# Patient Record
Sex: FEMALE | Race: WHITE | Employment: STUDENT | URBAN - METROPOLITAN AREA
[De-identification: names, ages, dates, MRNs, and addresses within clinical notes are randomized per-mention and may not be internally consistent; named-entity substitution may affect disease eponyms.]

---

## 2019-04-29 ENCOUNTER — TRANSFERRED RECORDS (OUTPATIENT)
Dept: HEALTH INFORMATION MANAGEMENT | Facility: CLINIC | Age: 24
End: 2019-04-29

## 2019-04-29 LAB — TSH SERPL-ACNC: <0.03 UIU/ML (ref 0.38–4.31)

## 2019-05-03 ENCOUNTER — TRANSFERRED RECORDS (OUTPATIENT)
Dept: HEALTH INFORMATION MANAGEMENT | Facility: CLINIC | Age: 24
End: 2019-05-03

## 2019-05-08 ENCOUNTER — TELEPHONE (OUTPATIENT)
Dept: ENDOCRINOLOGY | Facility: CLINIC | Age: 24
End: 2019-05-08

## 2019-05-08 NOTE — TELEPHONE ENCOUNTER
Yvrose with [facility] calls to confirm referral was received and if patient has scheduled appointment. Advised Yvrose we did receive referral and appointment has been scheduled.

## 2019-05-08 NOTE — TELEPHONE ENCOUNTER
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    Needs appt.  Records on michael's desk in [place].    I left a message for the patient to return our call.     Michael Camarena, TINY  [facility]  [facility]

## 2019-05-13 ENCOUNTER — HOSPITAL ENCOUNTER (OUTPATIENT)
Dept: ULTRASOUND IMAGING | Facility: CLINIC | Age: 24
End: 2019-05-13
Attending: NURSE PRACTITIONER
Payer: COMMERCIAL

## 2019-05-13 DIAGNOSIS — E04.2 MULTINODULAR GOITER: ICD-10-CM

## 2019-05-13 PROCEDURE — 76536 US EXAM OF HEAD AND NECK: CPT

## 2019-05-16 ENCOUNTER — OFFICE VISIT (OUTPATIENT)
Dept: ENDOCRINOLOGY | Facility: CLINIC | Age: 24
End: 2019-05-16
Payer: COMMERCIAL

## 2019-05-16 VITALS
TEMPERATURE: 98.2 F | RESPIRATION RATE: 20 BRPM | HEIGHT: 70 IN | DIASTOLIC BLOOD PRESSURE: 80 MMHG | HEART RATE: 100 BPM | BODY MASS INDEX: 20.94 KG/M2 | OXYGEN SATURATION: 99 % | WEIGHT: 146.3 LBS | SYSTOLIC BLOOD PRESSURE: 122 MMHG

## 2019-05-16 DIAGNOSIS — E05.90 HYPERTHYROIDISM: ICD-10-CM

## 2019-05-16 LAB
BASOPHILS # BLD AUTO: 0 10E9/L (ref 0–0.2)
BASOPHILS NFR BLD AUTO: 0.4 %
DIFFERENTIAL METHOD BLD: NORMAL
EOSINOPHIL # BLD AUTO: 0.1 10E9/L (ref 0–0.7)
EOSINOPHIL NFR BLD AUTO: 1.2 %
ERYTHROCYTE [DISTWIDTH] IN BLOOD BY AUTOMATED COUNT: 12.4 % (ref 10–15)
HCT VFR BLD AUTO: 43.9 % (ref 35–47)
HGB BLD-MCNC: 14.6 G/DL (ref 11.7–15.7)
LYMPHOCYTES # BLD AUTO: 1.8 10E9/L (ref 0.8–5.3)
LYMPHOCYTES NFR BLD AUTO: 34.8 %
MCH RBC QN AUTO: 29.2 PG (ref 26.5–33)
MCHC RBC AUTO-ENTMCNC: 33.3 G/DL (ref 31.5–36.5)
MCV RBC AUTO: 88 FL (ref 78–100)
MONOCYTES # BLD AUTO: 0.4 10E9/L (ref 0–1.3)
MONOCYTES NFR BLD AUTO: 6.7 %
NEUTROPHILS # BLD AUTO: 3 10E9/L (ref 1.6–8.3)
NEUTROPHILS NFR BLD AUTO: 56.9 %
PLATELET # BLD AUTO: 244 10E9/L (ref 150–450)
RBC # BLD AUTO: 5 10E12/L (ref 3.8–5.2)
WBC # BLD AUTO: 5.2 10E9/L (ref 4–11)

## 2019-05-16 PROCEDURE — 84481 FREE ASSAY (FT-3): CPT | Performed by: INTERNAL MEDICINE

## 2019-05-16 PROCEDURE — 99204 OFFICE O/P NEW MOD 45 MIN: CPT | Performed by: INTERNAL MEDICINE

## 2019-05-16 PROCEDURE — 80076 HEPATIC FUNCTION PANEL: CPT | Performed by: INTERNAL MEDICINE

## 2019-05-16 PROCEDURE — 84443 ASSAY THYROID STIM HORMONE: CPT | Performed by: INTERNAL MEDICINE

## 2019-05-16 PROCEDURE — 85025 COMPLETE CBC W/AUTO DIFF WBC: CPT | Performed by: INTERNAL MEDICINE

## 2019-05-16 PROCEDURE — 99000 SPECIMEN HANDLING OFFICE-LAB: CPT | Performed by: INTERNAL MEDICINE

## 2019-05-16 PROCEDURE — 84439 ASSAY OF FREE THYROXINE: CPT | Performed by: INTERNAL MEDICINE

## 2019-05-16 PROCEDURE — 84445 ASSAY OF TSI GLOBULIN: CPT | Mod: 90 | Performed by: INTERNAL MEDICINE

## 2019-05-16 PROCEDURE — 36415 COLL VENOUS BLD VENIPUNCTURE: CPT | Performed by: INTERNAL MEDICINE

## 2019-05-16 ASSESSMENT — MIFFLIN-ST. JEOR: SCORE: 1503.86

## 2019-05-16 NOTE — PROGRESS NOTES
Name: Vale Page  Seen in consultation with Luciana Rodrigues FNBABAR for Hyperthyroidism and thyromegaly.    HPI:  Vale Page is a 24 year old female who presents for the evaluation of Hyperthyroidism.  Having s/s since dec 2018  Noticed that thyroid was big in fall 2018. Feels like it is getting bigger.  She is a nursing student at [employer].  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    History of radiation exposure: NO  History of thyroid dysfunction: NO  Palpitations:  On and off since dec 2018.  Changes to hair or skin: No  Diarrhea/Constipation:both on and off. More constipation.  Changes in menses: has implant in place  Changes in vision:No  Diplopia/Blurryness:No  Dysphagia or Shortness of breath:more like fullness  Muscle aches or pain:No  Tremors:Yes: ongoing X Dec 2018  Difficulty sleeping: difficulty falling asleep.  Changes in weight: No  Lithium/Amiodarone: No  URI: No  CT Scans:No    PMH/PSH:  History reviewed. No pertinent past medical history.  Past Surgical History:   Procedure Laterality Date     AS REMOVAL OF TONSILS,<13 Y/O       Family Hx:  Family History   Problem Relation Age of Onset     Family History Negative Mother      Hypertension Maternal Grandfather      Thyroid disease: No           Social Hx:  Social History     Socioeconomic History     Marital status: Single     Spouse name: Not on file     Number of children: Not on file     Years of education: Not on file     Highest education level: Not on file   Occupational History     Not on file   Social Needs     Financial resource strain: Not on file     Food insecurity:     Worry: Not on file     Inability: Not on file     Transportation needs:     Medical: Not on file     Non-medical: Not on file   Tobacco Use     Smoking status: Never Smoker     Smokeless tobacco: Never Used   Substance and Sexual Activity     Alcohol use: Yes     " Comment: occasionally     Drug use: Never     Sexual activity: Yes   Lifestyle     Physical activity:     Days per week: Not on file     Minutes per session: Not on file     Stress: Not on file   Relationships     Social connections:     Talks on phone: Not on file     Gets together: Not on file     Attends Latter day service: Not on file     Active member of club or organization: Not on file     Attends meetings of clubs or organizations: Not on file     Relationship status: Not on file     Intimate partner violence:     Fear of current or ex partner: Not on file     Emotionally abused: Not on file     Physically abused: Not on file     Forced sexual activity: Not on file   Other Topics Concern     Parent/sibling w/ CABG, MI or angioplasty before 65F 55M? Not Asked   Social History Narrative     Not on file          MEDICATIONS:  currently has no medications in their medication list.    ROS   ROS: 10 point ROS neg other than the symptoms noted above in the HPI.    Physical Exam   VS: /80 (BP Location: Right arm, Patient Position: Sitting, Cuff Size: Adult Regular)   Pulse 100   Temp 98.2  F (36.8  C) (Oral)   Resp 20   Ht 1.778 m (5' 10\")   Wt 66.4 kg (146 lb 4.8 oz)   LMP 04/24/2019 (Approximate)   SpO2 99%   Breastfeeding? No   BMI 20.99 kg/m    GENERAL: AXOX3, NAD, well dressed, answering questions appropriately, appears stated age.  HEENT: OP clear, no LAD, no TM, non-tender, no exopthalmous, no proptosis, EOMI, no lig lag, no retraction  NECK: Thyroid moderately enlarged, non tender, no nodules were palpated.  CV: RRR, no rubs, gallops, no murmurs  LUNGS: CTAB, no wheezes, rales, or ronchi  ABDOMEN: +BS  EXTREMITIES: no edema, +pulses, no rashes, no lesions, no pretibial edema  NEUROLOGY: CN grossly intact, + DTR upper and lower extremity, no tremors  MSK: grossly intact  SKIN: no rashes, no lesions    LABS:  Labs done in April 2019 showing TSH less than 0.03, free T4 was high at 3.9, free T4 " was high normal at 1.5.  These labs were done at [facility].    Thyroid US 5/2019:  ULTRASOUND THYROID   5/13/2019 11:12 AM     HISTORY: Multinodular goiter.     COMPARISON: None.     FINDINGS: The thyroid gland appears mildly prominent in size. The  right lobe of the thyroid measures 5.6 x 1.8 x 1.5 cm. The left lobe  of the thyroid measures 5.6 x 2 x 1.7 cm. The isthmus measures 0.4 cm.  Background thyroid parenchymal echogenicity is heterogeneous and  diffusely hypervascular. No discrete thyroid nodules are identified. A  mildly prominent lymph node within zone II on the right measures 1.9 x  1.3 x 0.6 cm. This lymph node demonstrates no definite associated  fatty hilum and appears mildly hypervascular. A small normal-appearing  lymph node with a small fatty hilum corresponds to a palpable  abnormality in the left neck posteriorly, and measures 1 x 1 x 0.3 cm.                                                                      IMPRESSION:   1. The thyroid gland appears mildly prominent, heterogeneous, and  diffusely hypervascular.  2. No discrete thyroid nodules or masses are seen.  3. A mildly prominent level II lymph node on the right demonstrates no  definite fatty hilum and mildly increased vascularity. This lymph node  is nonspecific and may be reactive, however given the lack of visible  fatty hilum, clinical correlation and follow-up are recommended.  All pertinent notes, labs, and images personally reviewed by me.   Available records, labs and images from outside clinic were personally reviewed.    A/P  Ms.Bridget Page is a 24 year old here for the evaluation of hyperthyroidism.    Suppressed TSH:  Differential for hyperthyroidism includes:  Graves' disease, thyroiditis, or autonomous hyperfunctioning nodule.    Labs from April 2019 consistent with hyperthyroidism with suppressed TSH and high free T4  Clinically looks euthyroid  Has occasional palpitations.  Denies unintentional  weight loss or diarrhea  Heart rate is in 100s.  She feels fine.  Plan:  Discussed diagnosis, pathophysiology, management and treatment options of condition with pt.  Discussed differential diagnosis hyperthyroidism.  Today we will repeat her thyroid function (TSH, freeT4, T3 total, TPO, TSI), CBC, LFT and obtain an uptake and scan.    Based on these results further treatment will be discussed.     Heart rate is 100.  Discussed small dose of beta-blocker.  But at this time she would like to hold off.  She will call my office if she noticed more palpitation.  Discussed signs and symptoms of hyperthyroidism to watch for.    An uptake and scan of her thyroid gland will help differentiate the diagnosis.  In Graves' disease her uptake will be homogeneous and increased, in thyroiditis her uptake will be low, and in an autonomous hyperfunctioing nodule the uptake will be increased in a focal area.    Thyroid-stimulating immunoglobulins (TSI) can be detected in the majority of patients (77.8%) with Graves  disease.  It can be used to predict relapse or remission when using PTU/methimazole or radioiodine.  These assays have also been advocated for use in patients with subclinical Graves  hyperthyroidism or patients with unilateral ophthalmopathy.  Thyrotoxicosis associated with subacute thyroiditis is usually mild and transient.  The   patient lacks the physical findings of long-standing thyrotoxicosis. The goiter is typically  painful and low or absent 131I uptake.  Usually the erythrocyte sedimentation rate (ESR) and CRP are greatly elevated, and the leukocyte count may also be increased. Antibody titers are low or negative.    Thyromegaly:  Moderately enlarged thyroid gland  No discrete nodule identified on ultrasound  No major compressive symptoms  No family history of thyroid cancer.  No history of radiation  Plan:  Discussed diagnosis, pathophysiology, management and treatment options of condition with pt.    Abnormal  lymph node on ultrasound:  Incidental finding of abnormal zone 2 lymph node as noted above  No other lymphadenopathy noted  Will get CBC  Recommend close follow-up with repeat ultrasound in 3-4 months        An uptake and scan of her thyroid gland will help differentiate the diagnosis.  In Graves' disease her uptake will be homogeneous and increased, in thyroiditis her uptake will be low, and in an autonomous hyperfunctioing nodule the uptake will be increased in a focal area.    Thyroid-stimulating immunoglobulins (TSI) can be detected in the majority of patients (77.8%) with Graves  disease.  It can be used to predict relapse or remission when using PTU/methimazole or radioiodine.  These assays have also been advocated for use in patients with subclinical Graves  hyperthyroidism or patients with unilateral ophthalmopathy.  Thyrotoxicosis associated with subacute thyroiditis is usually mild and transient.  The   patient lacks the physical findings of long-standing thyrotoxicosis. The goiter is typically  painful and low or absent 131I uptake.  Usually the erythrocyte sedimentation rate (ESR) and CRP are greatly elevated, and the leukocyte count may also be increased. Antibody titers are low or negative.    Treatment of Graves' hyperthyroidism consists of amelioration of symptoms with a beta-blocker and measures aimed at decreasing thyroid hormone synthesis: the administration of a thionamide, radioiodine ablation, or surgery.  Thionamides -- Thionamides (methimazole and propylthiouracial (PTU)).  Methimazole is preferred because of its longer duration of action (once daily dosing) and has lower incidence of side effects.   PTU is preferred during pregnancy because of the potential teratogenic effects of methimazole.   The goal of therapy in Graves' hyperthyroidism is to be euthyroid within three to eight weeks. This can be followed by ablative therapy with radioiodine or surgery or by continuation of the drug for a  prolonged period (usually one to two years) with the hope of attaining a permanent remission. If long-term medical therapy is chosen, the dose of methimazole is then tapered to a maintenance dose with the goal of maintaining a euthyroid state.  Both MMI and PTU can cause pruritus, rash, urticaria, arthralgias, arthritis, fever, abnormal taste sensation, nausea, or vomiting in up to 13 percent of patients.  If one drug is not tolerated, the other drug can be substituted, but up to 50 percent of patients have cross-sensitivity. The gastrointestinal side effects are dose-dependent. Thus, patients taking higher doses of MMI should be started on divided doses.  Agranulocytosis is a rare but serious complication of thionamide therapy, with a prevalence of 0.2 to 0.5 percent, and usually occurs within the first two months of treatment. The risk of agranulocytosis is higher for antithyroid drugs than for 20 other classes of drugs associated with this rare complication.  Hepatotoxicity is a rare complication of thionamide therapy. Serum aminotransferase concentrations increase transiently in up to one-third of patients taking PTU.    Radioiodine ablation -- Radioiodine is widely used for the treatment of Graves' hyperthyroidism. It is the therapy of choice in the [place].  Radioiodine therapy may be associated with an increased risk of the development or worsening of Graves' ophthalmopathy.  Radioiodine is administered as a capsule and induces extensive tissue damage, resulting in ablation of the thyroid within 6 to 18 weeks.   Approximately 20 percent of patients fail the first radioiodine treatment and require a second or subsequent dose. These patients usually have more severe hyperthyroidism or larger goiters.    Follow-up:  After above.    Liss Godoy MD  Endocrinology  [place] [facility]  CC: No Ref-Primary, Physician     More than 50% of face to face time spent with Ms. Page on counseling /  coordinating her care.      All questions were answered.  The patient indicates understanding of the above issues and agrees with the plan set forth.     Addendum to above note and clinic visit:    Labs reviewed.    See result note/telephone encounter.

## 2019-05-16 NOTE — LETTER
[facility]  [address], [address]  [place] [address]  [phone]        May 28, 2019    Vale Camilo  [address] [facility] [address]            Dear Ms. Vale Page:    Labs/ Imaging studies done with endocrinology showed:   Lab screening test for Graves' disease--TSI antibodies was negative.     Please call endocrinology clinic (nurse line: [phone]) if questions.     Sincerely,        Dr. Liss Godoy    Results for orders placed or performed in visit on 05/16/19   T3 Free   Result Value Ref Range    Free T3 2.8 2.3 - 4.2 pg/mL   T4 free   Result Value Ref Range    T4 Free 1.06 0.76 - 1.46 ng/dL   TSH   Result Value Ref Range    TSH 0.01 (L) 0.40 - 4.00 mU/L   Thyroid stimulating immunoglobulin   Result Value Ref Range    Thyroid Stim Immunog <1.0 <=1.3 TSI index   CBC with platelets and differential   Result Value Ref Range    WBC 5.2 4.0 - 11.0 10e9/L    RBC Count 5.00 3.8 - 5.2 10e12/L    Hemoglobin 14.6 11.7 - 15.7 g/dL    Hematocrit 43.9 35.0 - 47.0 %    MCV 88 78 - 100 fl    MCH 29.2 26.5 - 33.0 pg    MCHC 33.3 31.5 - 36.5 g/dL    RDW 12.4 10.0 - 15.0 %    Platelet Count 244 150 - 450 10e9/L    % Neutrophils 56.9 %    % Lymphocytes 34.8 %    % Monocytes 6.7 %    % Eosinophils 1.2 %    % Basophils 0.4 %    Absolute Neutrophil 3.0 1.6 - 8.3 10e9/L    Absolute Lymphocytes 1.8 0.8 - 5.3 10e9/L    Absolute Monocytes 0.4 0.0 - 1.3 10e9/L    Absolute Eosinophils 0.1 0.0 - 0.7 10e9/L    Absolute Basophils 0.0 0.0 - 0.2 10e9/L    Diff Method Automated Method    Hepatic panel   Result Value Ref Range    Bilirubin Direct 0.3 (H) 0.0 - 0.2 mg/dL    Bilirubin Total 2.1 (H) 0.2 - 1.3 mg/dL    Albumin 3.9 3.4 - 5.0 g/dL    Protein Total 7.5 6.8 - 8.8 g/dL    Alkaline Phosphatase 63 40 - 150 U/L    ALT 23 0 - 50 U/L    AST 20 0 - 45 U/L

## 2019-05-16 NOTE — LETTER
5/16/2019         RE: Vale Page  [address]  [facility] [address]        Dear Colleague,    Thank you for referring your patient, Vale Page, to the [facility]. Please see a copy of my visit note below.    Name: Vale Page  Seen in consultation with Luciana Rodrigues FNP for Hyperthyroidism and thyromegaly.    HPI:  Vale Page is a 24 year old female who presents for the evaluation of Hyperthyroidism.  Having s/s since dec 2018  Noticed that thyroid was big in fall 2018. Feels like it is getting bigger.  She is a nursing student at [employer].  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    History of radiation exposure: NO  History of thyroid dysfunction: NO  Palpitations:  On and off since dec 2018.  Changes to hair or skin: No  Diarrhea/Constipation:both on and off. More constipation.  Changes in menses: has implant in place  Changes in vision:No  Diplopia/Blurryness:No  Dysphagia or Shortness of breath:more like fullness  Muscle aches or pain:No  Tremors:Yes: ongoing X Dec 2018  Difficulty sleeping: difficulty falling asleep.  Changes in weight: No  Lithium/Amiodarone: No  URI: No  CT Scans:No    PMH/PSH:  History reviewed. No pertinent past medical history.  Past Surgical History:   Procedure Laterality Date     AS REMOVAL OF TONSILS,<15 Y/O       Family Hx:  Family History   Problem Relation Age of Onset     Family History Negative Mother      Hypertension Maternal Grandfather      Thyroid disease: No           Social Hx:  Social History     Socioeconomic History     Marital status: Single     Spouse name: Not on file     Number of children: Not on file     Years of education: Not on file     Highest education level: Not on file   Occupational History     Not on file   Social Needs     Financial resource strain: Not on file     Food insecurity:     Worry: Not on file     " Inability: Not on file     Transportation needs:     Medical: Not on file     Non-medical: Not on file   Tobacco Use     Smoking status: Never Smoker     Smokeless tobacco: Never Used   Substance and Sexual Activity     Alcohol use: Yes     Comment: occasionally     Drug use: Never     Sexual activity: Yes   Lifestyle     Physical activity:     Days per week: Not on file     Minutes per session: Not on file     Stress: Not on file   Relationships     Social connections:     Talks on phone: Not on file     Gets together: Not on file     Attends Taoist service: Not on file     Active member of club or organization: Not on file     Attends meetings of clubs or organizations: Not on file     Relationship status: Not on file     Intimate partner violence:     Fear of current or ex partner: Not on file     Emotionally abused: Not on file     Physically abused: Not on file     Forced sexual activity: Not on file   Other Topics Concern     Parent/sibling w/ CABG, MI or angioplasty before 65F 55M? Not Asked   Social History Narrative     Not on file          MEDICATIONS:  currently has no medications in their medication list.    ROS   ROS: 10 point ROS neg other than the symptoms noted above in the HPI.    Physical Exam   VS: /80 (BP Location: Right arm, Patient Position: Sitting, Cuff Size: Adult Regular)   Pulse 100   Temp 98.2  F (36.8  C) (Oral)   Resp 20   Ht 1.778 m (5' 10\")   Wt 66.4 kg (146 lb 4.8 oz)   LMP 04/24/2019 (Approximate)   SpO2 99%   Breastfeeding? No   BMI 20.99 kg/m     GENERAL: AXOX3, NAD, well dressed, answering questions appropriately, appears stated age.  HEENT: OP clear, no LAD, no TM, non-tender, no exopthalmous, no proptosis, EOMI, no lig lag, no retraction  NECK: Thyroid moderately enlarged, non tender, no nodules were palpated.  CV: RRR, no rubs, gallops, no murmurs  LUNGS: CTAB, no wheezes, rales, or ronchi  ABDOMEN: +BS  EXTREMITIES: no edema, +pulses, no rashes, no lesions, " no pretibial edema  NEUROLOGY: CN grossly intact, + DTR upper and lower extremity, no tremors  MSK: grossly intact  SKIN: no rashes, no lesions    LABS:  Labs done in April 2019 showing TSH less than 0.03, free T4 was high at 3.9, free T4 was high normal at 1.5.  These labs were done at [facility].    Thyroid US 5/2019:  ULTRASOUND THYROID   5/13/2019 11:12 AM     HISTORY: Multinodular goiter.     COMPARISON: None.     FINDINGS: The thyroid gland appears mildly prominent in size. The  right lobe of the thyroid measures 5.6 x 1.8 x 1.5 cm. The left lobe  of the thyroid measures 5.6 x 2 x 1.7 cm. The isthmus measures 0.4 cm.  Background thyroid parenchymal echogenicity is heterogeneous and  diffusely hypervascular. No discrete thyroid nodules are identified. A  mildly prominent lymph node within zone II on the right measures 1.9 x  1.3 x 0.6 cm. This lymph node demonstrates no definite associated  fatty hilum and appears mildly hypervascular. A small normal-appearing  lymph node with a small fatty hilum corresponds to a palpable  abnormality in the left neck posteriorly, and measures 1 x 1 x 0.3 cm.                                                                      IMPRESSION:   1. The thyroid gland appears mildly prominent, heterogeneous, and  diffusely hypervascular.  2. No discrete thyroid nodules or masses are seen.  3. A mildly prominent level II lymph node on the right demonstrates no  definite fatty hilum and mildly increased vascularity. This lymph node  is nonspecific and may be reactive, however given the lack of visible  fatty hilum, clinical correlation and follow-up are recommended.  All pertinent notes, labs, and images personally reviewed by me.   Available records, labs and images from outside clinic were personally reviewed.    A/P  Ms.Bridget Page is a 24 year old here for the evaluation of hyperthyroidism.    Suppressed TSH:  Differential for hyperthyroidism includes:  Graves'  disease, thyroiditis, or autonomous hyperfunctioning nodule.    Labs from April 2019 consistent with hyperthyroidism with suppressed TSH and high free T4  Clinically looks euthyroid  Has occasional palpitations.  Denies unintentional weight loss or diarrhea  Heart rate is in 100s.  She feels fine.  Plan:  Discussed diagnosis, pathophysiology, management and treatment options of condition with pt.  Discussed differential diagnosis hyperthyroidism.  Today we will repeat her thyroid function (TSH, freeT4, T3 total, TPO, TSI), CBC, LFT and obtain an uptake and scan.    Based on these results further treatment will be discussed.     Heart rate is 100.  Discussed small dose of beta-blocker.  But at this time she would like to hold off.  She will call my office if she noticed more palpitation.  Discussed signs and symptoms of hyperthyroidism to watch for.    An uptake and scan of her thyroid gland will help differentiate the diagnosis.  In Graves' disease her uptake will be homogeneous and increased, in thyroiditis her uptake will be low, and in an autonomous hyperfunctioing nodule the uptake will be increased in a focal area.    Thyroid-stimulating immunoglobulins (TSI) can be detected in the majority of patients (77.8%) with Graves  disease.  It can be used to predict relapse or remission when using PTU/methimazole or radioiodine.  These assays have also been advocated for use in patients with subclinical Graves  hyperthyroidism or patients with unilateral ophthalmopathy.  Thyrotoxicosis associated with subacute thyroiditis is usually mild and transient.  The   patient lacks the physical findings of long-standing thyrotoxicosis. The goiter is typically  painful and low or absent 131I uptake.  Usually the erythrocyte sedimentation rate (ESR) and CRP are greatly elevated, and the leukocyte count may also be increased. Antibody titers are low or negative.    Thyromegaly:  Moderately enlarged thyroid gland  No discrete  nodule identified on ultrasound  No major compressive symptoms  No family history of thyroid cancer.  No history of radiation  Plan:  Discussed diagnosis, pathophysiology, management and treatment options of condition with pt.    Abnormal lymph node on ultrasound:  Incidental finding of abnormal zone 2 lymph node as noted above  No other lymphadenopathy noted  Will get CBC  Recommend close follow-up with repeat ultrasound in 3-4 months        An uptake and scan of her thyroid gland will help differentiate the diagnosis.  In Graves' disease her uptake will be homogeneous and increased, in thyroiditis her uptake will be low, and in an autonomous hyperfunctioing nodule the uptake will be increased in a focal area.    Thyroid-stimulating immunoglobulins (TSI) can be detected in the majority of patients (77.8%) with Graves  disease.  It can be used to predict relapse or remission when using PTU/methimazole or radioiodine.  These assays have also been advocated for use in patients with subclinical Graves  hyperthyroidism or patients with unilateral ophthalmopathy.  Thyrotoxicosis associated with subacute thyroiditis is usually mild and transient.  The   patient lacks the physical findings of long-standing thyrotoxicosis. The goiter is typically  painful and low or absent 131I uptake.  Usually the erythrocyte sedimentation rate (ESR) and CRP are greatly elevated, and the leukocyte count may also be increased. Antibody titers are low or negative.    Treatment of Graves' hyperthyroidism consists of amelioration of symptoms with a beta-blocker and measures aimed at decreasing thyroid hormone synthesis: the administration of a thionamide, radioiodine ablation, or surgery.  Thionamides -- Thionamides (methimazole and propylthiouracial (PTU)).  Methimazole is preferred because of its longer duration of action (once daily dosing) and has lower incidence of side effects.   PTU is preferred during pregnancy because of the potential  teratogenic effects of methimazole.   The goal of therapy in Graves' hyperthyroidism is to be euthyroid within three to eight weeks. This can be followed by ablative therapy with radioiodine or surgery or by continuation of the drug for a prolonged period (usually one to two years) with the hope of attaining a permanent remission. If long-term medical therapy is chosen, the dose of methimazole is then tapered to a maintenance dose with the goal of maintaining a euthyroid state.  Both MMI and PTU can cause pruritus, rash, urticaria, arthralgias, arthritis, fever, abnormal taste sensation, nausea, or vomiting in up to 13 percent of patients.  If one drug is not tolerated, the other drug can be substituted, but up to 50 percent of patients have cross-sensitivity. The gastrointestinal side effects are dose-dependent. Thus, patients taking higher doses of MMI should be started on divided doses.  Agranulocytosis is a rare but serious complication of thionamide therapy, with a prevalence of 0.2 to 0.5 percent, and usually occurs within the first two months of treatment. The risk of agranulocytosis is higher for antithyroid drugs than for 20 other classes of drugs associated with this rare complication.  Hepatotoxicity is a rare complication of thionamide therapy. Serum aminotransferase concentrations increase transiently in up to one-third of patients taking PTU.    Radioiodine ablation -- Radioiodine is widely used for the treatment of Graves' hyperthyroidism. It is the therapy of choice in the [place].  Radioiodine therapy may be associated with an increased risk of the development or worsening of Graves' ophthalmopathy.  Radioiodine is administered as a capsule and induces extensive tissue damage, resulting in ablation of the thyroid within 6 to 18 weeks.   Approximately 20 percent of patients fail the first radioiodine treatment and require a second or subsequent dose. These patients usually have more severe  hyperthyroidism or larger goiters.    Follow-up:  After above.    Liss Godoy MD  Endocrinology  [facility]/[place]  CC: No Ref-Primary, Physician     More than 50% of face to face time spent with Ms. Page on counseling / coordinating her care.      All questions were answered.  The patient indicates understanding of the above issues and agrees with the plan set forth.     Addendum to above note and clinic visit:    Labs reviewed.    See result note/telephone encounter.              Again, thank you for allowing me to participate in the care of your patient.        Sincerely,        Liss Godoy MD

## 2019-05-16 NOTE — LETTER
[facility]  [address], [address]  [place] [address]  [phone]        May 23, 2019    Vale Page  [address] [facility] [address]            Dear Ms. Vale Page:    Liver function test is in acceptable range.  Mild elevation in bilirubin but it is not significant   Plan to continue to monitor   In terms of CBC--which was obtained as there was lymphadenopathy noted on ultrasound--CBC is in normal range.   I recommended to follow-up with primary care provider about ongoing work-up of neck lymphadenopathy noted on ultrasound.   If you have any further questions or problems, please contact our office.    Sincerely,        Dr. Liss Florentinokar    Results for orders placed or performed in visit on 05/16/19   T3 Free   Result Value Ref Range    Free T3 2.8 2.3 - 4.2 pg/mL   T4 free   Result Value Ref Range    T4 Free 1.06 0.76 - 1.46 ng/dL   TSH   Result Value Ref Range    TSH 0.01 (L) 0.40 - 4.00 mU/L   CBC with platelets and differential   Result Value Ref Range    WBC 5.2 4.0 - 11.0 10e9/L    RBC Count 5.00 3.8 - 5.2 10e12/L    Hemoglobin 14.6 11.7 - 15.7 g/dL    Hematocrit 43.9 35.0 - 47.0 %    MCV 88 78 - 100 fl    MCH 29.2 26.5 - 33.0 pg    MCHC 33.3 31.5 - 36.5 g/dL    RDW 12.4 10.0 - 15.0 %    Platelet Count 244 150 - 450 10e9/L    % Neutrophils 56.9 %    % Lymphocytes 34.8 %    % Monocytes 6.7 %    % Eosinophils 1.2 %    % Basophils 0.4 %    Absolute Neutrophil 3.0 1.6 - 8.3 10e9/L    Absolute Lymphocytes 1.8 0.8 - 5.3 10e9/L    Absolute Monocytes 0.4 0.0 - 1.3 10e9/L    Absolute Eosinophils 0.1 0.0 - 0.7 10e9/L    Absolute Basophils 0.0 0.0 - 0.2 10e9/L    Diff Method Automated Method    Hepatic panel   Result Value Ref Range    Bilirubin Direct 0.3 (H) 0.0 - 0.2 mg/dL    Bilirubin Total 2.1 (H) 0.2 - 1.3 mg/dL    Albumin 3.9 3.4 - 5.0 g/dL    Protein Total 7.5 6.8 - 8.8 g/dL    Alkaline Phosphatase 63 40 - 150 U/L    ALT 23 0 - 50 U/L    AST 20 0 - 45 U/L

## 2019-05-16 NOTE — PATIENT INSTRUCTIONS
[facility] & [place] locations   Dr Godoy, Endocrinology Department      [facility]   [address] [address]  Appointment [phone]  Fax: [phone]   Monday and Tuesday         [facility]   [address] [facility] [place] [address]  Appointment [phone]  Fax: [phone]  Wednesday and Thursday           Labs today.  Follow up with radiology for thyroid uptake and scan  Follow up after above.     [facility] radiology scheduleing   [place]  [phone]   [facility] Radiology scheduling  [place]  [phone]     Please call and schedule the recommended test as discussed in clinic visit. These are the numbers to call.

## 2019-05-17 LAB
ALBUMIN SERPL-MCNC: 3.9 G/DL (ref 3.4–5)
ALP SERPL-CCNC: 63 U/L (ref 40–150)
ALT SERPL W P-5'-P-CCNC: 23 U/L (ref 0–50)
AST SERPL W P-5'-P-CCNC: 20 U/L (ref 0–45)
BILIRUB DIRECT SERPL-MCNC: 0.3 MG/DL (ref 0–0.2)
BILIRUB SERPL-MCNC: 2.1 MG/DL (ref 0.2–1.3)
PROT SERPL-MCNC: 7.5 G/DL (ref 6.8–8.8)
T3FREE SERPL-MCNC: 2.8 PG/ML (ref 2.3–4.2)
T4 FREE SERPL-MCNC: 1.06 NG/DL (ref 0.76–1.46)
TSH SERPL DL<=0.005 MIU/L-ACNC: 0.01 MU/L (ref 0.4–4)

## 2019-05-20 ENCOUNTER — HOSPITAL ENCOUNTER (OUTPATIENT)
Dept: NUCLEAR MEDICINE | Facility: CLINIC | Age: 24
Setting detail: NUCLEAR MEDICINE
Discharge: HOME | End: 2019-05-20
Attending: INTERNAL MEDICINE | Admitting: INTERNAL MEDICINE
Payer: COMMERCIAL

## 2019-05-20 DIAGNOSIS — E05.90 HYPERTHYROIDISM: ICD-10-CM

## 2019-05-20 PROCEDURE — 78014 THYROID IMAGING W/BLOOD FLOW: CPT

## 2019-05-20 PROCEDURE — 34300033 ZZH RX 343: Performed by: INTERNAL MEDICINE

## 2019-05-20 PROCEDURE — A9516 IODINE I-123 SOD IODIDE MIC: HCPCS | Performed by: INTERNAL MEDICINE

## 2019-05-20 RX ADMIN — Medication 244.7 UCI.: at 12:02

## 2019-05-21 ENCOUNTER — HOSPITAL ENCOUNTER (OUTPATIENT)
Dept: NUCLEAR MEDICINE | Facility: CLINIC | Age: 24
Setting detail: NUCLEAR MEDICINE
Discharge: HOME | End: 2019-05-21
Attending: INTERNAL MEDICINE | Admitting: INTERNAL MEDICINE
Payer: COMMERCIAL

## 2019-05-22 ENCOUNTER — TELEPHONE (OUTPATIENT)
Dept: ENDOCRINOLOGY | Facility: CLINIC | Age: 24
End: 2019-05-22

## 2019-05-22 DIAGNOSIS — E05.90 HYPERTHYROIDISM: Primary | ICD-10-CM

## 2019-05-22 NOTE — LETTER
[facility]  [address], [address]  [place], [place]  [address]                                            TEL:[phone]  FAX:[phone]      TYRONE Rolle  [facility] [address]   [place] [address]      Vale Page  [address]  [facility] [address]      May 23, 2019    Dear TYRONE Rolle,    Thyroid uptake and scan is showing low update which is consistent with thyroiditis.    Typically  thyroiditis last for a transient period of time and medication is not indicated.  Symptomatic management is only indicated.    I recommend to continue to monitor and repeat labs in 4 weeks  Please make a lab appointment for blood work and follow up clinic appointment in 1 week after that to discuss results.    She'll follow up with you for neck lymph nodes.    Sincerely,      Dr. Liss Godoy    Results for orders placed or performed during the hospital encounter of 05/20/19   NM Thyroid Uptake and Scan    Narrative    NUCLEAR MEDICINE THYROID UPTAKE AND SCAN May 21, 2019 12:19 PM    HISTORY: Hyperthyroidism.    COMPARISON: Thyroid ultrasound performed 5/13/2019.    DOSE: 244.7 uCi I-123.    FINDINGS: The thyroid gland appears normal in size and relatively  homogeneous in uptake. 24-hour radioiodine uptake was calculated at  1.1%, which is within the normal range. Normal range is 10-30% 24-hour  uptake. No focal increased or decreased uptake is identified to  suggest the presence of a hot or cold thyroid nodule.      Impression    IMPRESSION: 24-hour radioiodine uptake in the thyroid is abnormally  low at 1.1%.     BERNABE ALEJANDRO MD

## 2019-05-22 NOTE — TELEPHONE ENCOUNTER
[facility] THYROID LABS-[facility] Latest Ref Rng & Units 5/16/2019   TSH 0.40 - 4.00 mU/L 0.01 (L)   T4 FREE 0.76 - 1.46 ng/dL 1.06   FREE T3 2.3 - 4.2 pg/mL 2.8     NUCLEAR MEDICINE THYROID UPTAKE AND SCAN May 21, 2019 12:19 PM     HISTORY: Hyperthyroidism.     COMPARISON: Thyroid ultrasound performed 5/13/2019.     DOSE: 244.7 uCi I-123.     FINDINGS: The thyroid gland appears normal in size and relatively  homogeneous in uptake. 24-hour radioiodine uptake was calculated at  1.1%, which is within the normal range. Normal range is 10-30% 24-hour  uptake. No focal increased or decreased uptake is identified to  suggest the presence of a hot or cold thyroid nodule.                                                                      IMPRESSION: 24-hour radioiodine uptake in the thyroid is abnormally  low at 1.1%.     ----  Last clinic visit May 16, 2019  Thyroid uptake and scan is showing low update which is consistent with thyroiditis.    Typically  thyroiditis last for a transient period of time and medication is not indicated.  Symptomatic management is only indicated.    I recommend to continue to monitor and repeat labs in 4 weeks  Please make a lab appointment for blood work and follow up clinic appointment in 1 week after that to discuss results.    Please call patient with above information.  If she has any questions I will be happy to answer.  Please schedule phone visit.      Liss Godoy MD  Endocrinology   [facility]/[place]  May 22, 2019

## 2019-05-23 LAB — TSI SER-ACNC: <1 TSI INDEX

## 2019-05-23 NOTE — TELEPHONE ENCOUNTER
I left a message for the patient to return our call.  Please get her pcp information and tell her the info below too.    Marlys Camarena, [facility] Endocrinology  [place]      -- please send a letter to pcp at [facility] with labs resutls and a note saying that the lymphadenopathy needs to be monitored.    also wehn u call her please make sure she understands that she has to f/u wiht PCP for neck lymoh nodes and document

## 2019-05-23 NOTE — TELEPHONE ENCOUNTER
Patient states she doesn't really have a primary doctor but she will talk to her mother and get set up with her primary provider. Patient will call us back with the contact information.  For now while the patient is at college she sees the NP on campus.

## 2019-07-08 ENCOUNTER — TELEPHONE (OUTPATIENT)
Dept: ENDOCRINOLOGY | Facility: CLINIC | Age: 24
End: 2019-07-08

## 2019-07-08 NOTE — TELEPHONE ENCOUNTER
HP can see records thru care everywhere.    Marlys Camarena, TINY  [facility]  [place]

## 2019-07-08 NOTE — TELEPHONE ENCOUNTER
Patient was told when she finds a new primary to call and we would fax lab results. Health [facility] katia Medina to call and Suman [phone]

## 2021-01-10 ENCOUNTER — WALK IN (OUTPATIENT)
Dept: URGENT CARE | Age: 26
End: 2021-01-10

## 2021-01-10 ENCOUNTER — IMAGING SERVICES (OUTPATIENT)
Dept: GENERAL RADIOLOGY | Age: 26
End: 2021-01-10
Attending: INTERNAL MEDICINE

## 2021-01-10 VITALS
DIASTOLIC BLOOD PRESSURE: 80 MMHG | TEMPERATURE: 98.6 F | HEIGHT: 62 IN | BODY MASS INDEX: 32.94 KG/M2 | SYSTOLIC BLOOD PRESSURE: 118 MMHG | OXYGEN SATURATION: 97 % | HEART RATE: 87 BPM | WEIGHT: 179 LBS | RESPIRATION RATE: 16 BRPM

## 2021-01-10 DIAGNOSIS — B97.89 VIRAL RESPIRATORY ILLNESS: ICD-10-CM

## 2021-01-10 DIAGNOSIS — B97.89 VIRAL RESPIRATORY ILLNESS: Primary | ICD-10-CM

## 2021-01-10 DIAGNOSIS — J98.8 VIRAL RESPIRATORY ILLNESS: ICD-10-CM

## 2021-01-10 DIAGNOSIS — J98.8 VIRAL RESPIRATORY ILLNESS: Primary | ICD-10-CM

## 2021-01-10 DIAGNOSIS — Z20.822 SUSPECTED COVID-19 VIRUS INFECTION: ICD-10-CM

## 2021-01-10 LAB
FLUAV AG UPPER RESP QL IA.RAPID: NEGATIVE
FLUBV AG UPPER RESP QL IA.RAPID: NEGATIVE

## 2021-01-10 PROCEDURE — U0005 INFEC AGEN DETEC AMPLI PROBE: HCPCS | Performed by: CLINICAL MEDICAL LABORATORY

## 2021-01-10 PROCEDURE — U0003 INFECTIOUS AGENT DETECTION BY NUCLEIC ACID (DNA OR RNA); SEVERE ACUTE RESPIRATORY SYNDROME CORONAVIRUS 2 (SARS-COV-2) (CORONAVIRUS DISEASE [COVID-19]), AMPLIFIED PROBE TECHNIQUE, MAKING USE OF HIGH THROUGHPUT TECHNOLOGIES AS DESCRIBED BY CMS-2020-01-R: HCPCS | Performed by: CLINICAL MEDICAL LABORATORY

## 2021-01-10 PROCEDURE — 87804 INFLUENZA ASSAY W/OPTIC: CPT | Performed by: INTERNAL MEDICINE

## 2021-01-10 PROCEDURE — 71046 X-RAY EXAM CHEST 2 VIEWS: CPT | Performed by: RADIOLOGY

## 2021-01-10 PROCEDURE — 99203 OFFICE O/P NEW LOW 30 MIN: CPT | Performed by: INTERNAL MEDICINE

## 2021-01-10 RX ORDER — ALBUTEROL SULFATE 90 UG/1
2 AEROSOL, METERED RESPIRATORY (INHALATION) EVERY 4 HOURS PRN
Qty: 1 INHALER | Refills: 0 | Status: SHIPPED | OUTPATIENT
Start: 2021-01-10 | End: 2023-01-24 | Stop reason: ALTCHOICE

## 2021-01-10 RX ORDER — TRIAMCINOLONE ACETONIDE 1 MG/G
CREAM TOPICAL 2 TIMES DAILY
COMMUNITY

## 2021-01-10 ASSESSMENT — ENCOUNTER SYMPTOMS
FATIGUE: 1
VOICE CHANGE: 0
EYE DISCHARGE: 0
NUMBNESS: 0
TROUBLE SWALLOWING: 0
SHORTNESS OF BREATH: 1
VOMITING: 0
NAUSEA: 0
COUGH: 1
FEVER: 0
CONSTIPATION: 0
SINUS PRESSURE: 1
ABDOMINAL PAIN: 0
RHINORRHEA: 1
CHILLS: 0
HEADACHES: 1
CHEST TIGHTNESS: 0
SORE THROAT: 1
DIARRHEA: 0
DIZZINESS: 0
EYE ITCHING: 0

## 2021-01-11 ENCOUNTER — TELEPHONE (OUTPATIENT)
Dept: URGENT CARE | Age: 26
End: 2021-01-11

## 2021-01-11 LAB
SARS-COV-2 RNA RESP QL NAA+PROBE: NOT DETECTED
SERVICE CMNT-IMP: NORMAL
SERVICE CMNT-IMP: NORMAL

## 2021-12-23 ENCOUNTER — WALK IN (OUTPATIENT)
Dept: URGENT CARE | Age: 26
End: 2021-12-23

## 2021-12-23 VITALS
DIASTOLIC BLOOD PRESSURE: 54 MMHG | OXYGEN SATURATION: 98 % | SYSTOLIC BLOOD PRESSURE: 97 MMHG | RESPIRATION RATE: 18 BRPM | HEART RATE: 83 BPM | TEMPERATURE: 98.3 F

## 2021-12-23 DIAGNOSIS — J06.9 VIRAL URI WITH COUGH: Primary | ICD-10-CM

## 2021-12-23 DIAGNOSIS — J02.9 PHARYNGITIS, UNSPECIFIED ETIOLOGY: ICD-10-CM

## 2021-12-23 LAB
INTERNAL PROCEDURAL CONTROLS ACCEPTABLE: YES
S PYO AG THROAT QL IA.RAPID: NEGATIVE
SARS-COV+SARS-COV-2 AG RESP QL IA.RAPID: NOT DETECTED

## 2021-12-23 PROCEDURE — 99214 OFFICE O/P EST MOD 30 MIN: CPT | Performed by: FAMILY MEDICINE

## 2021-12-23 PROCEDURE — 87880 STREP A ASSAY W/OPTIC: CPT | Performed by: FAMILY MEDICINE

## 2021-12-23 PROCEDURE — 87426 SARSCOV CORONAVIRUS AG IA: CPT | Performed by: FAMILY MEDICINE

## 2021-12-23 PROCEDURE — U0003 INFECTIOUS AGENT DETECTION BY NUCLEIC ACID (DNA OR RNA); SEVERE ACUTE RESPIRATORY SYNDROME CORONAVIRUS 2 (SARS-COV-2) (CORONAVIRUS DISEASE [COVID-19]), AMPLIFIED PROBE TECHNIQUE, MAKING USE OF HIGH THROUGHPUT TECHNOLOGIES AS DESCRIBED BY CMS-2020-01-R: HCPCS | Performed by: CLINICAL MEDICAL LABORATORY

## 2021-12-23 PROCEDURE — U0005 INFEC AGEN DETEC AMPLI PROBE: HCPCS | Performed by: CLINICAL MEDICAL LABORATORY

## 2021-12-23 RX ORDER — CLOBETASOL PROPIONATE 0.5 MG/G
CREAM TOPICAL DAILY PRN
COMMUNITY

## 2021-12-23 RX ORDER — PROCHLORPERAZINE MALEATE 10 MG
10 TABLET ORAL 4 TIMES DAILY PRN
COMMUNITY
Start: 2021-04-08 | End: 2022-03-22 | Stop reason: ALTCHOICE

## 2021-12-23 RX ORDER — IBUPROFEN 800 MG/1
800 TABLET ORAL
COMMUNITY
Start: 2015-11-08

## 2021-12-23 RX ORDER — DIPHENHYDRAMINE HCL 25 MG
3 CAPSULE ORAL
COMMUNITY

## 2021-12-23 RX ORDER — ONDANSETRON 4 MG/1
4 TABLET, ORALLY DISINTEGRATING ORAL
COMMUNITY
Start: 2018-08-09 | End: 2022-03-22 | Stop reason: ALTCHOICE

## 2021-12-23 RX ORDER — CETIRIZINE HYDROCHLORIDE 10 MG/1
10 TABLET ORAL
COMMUNITY

## 2021-12-24 ENCOUNTER — TELEPHONE (OUTPATIENT)
Dept: URGENT CARE | Age: 26
End: 2021-12-24

## 2021-12-24 LAB
SARS-COV-2 RNA RESP QL NAA+PROBE: DETECTED
SERVICE CMNT-IMP: ABNORMAL

## 2021-12-27 ENCOUNTER — CASE MANAGEMENT (OUTPATIENT)
Dept: CARE COORDINATION | Age: 26
End: 2021-12-27

## 2022-01-06 ENCOUNTER — TELEPHONE (OUTPATIENT)
Dept: TELEHEALTH | Age: 27
End: 2022-01-06

## 2022-01-06 ENCOUNTER — V-VISIT (OUTPATIENT)
Dept: FAMILY MEDICINE | Age: 27
End: 2022-01-06

## 2022-01-06 DIAGNOSIS — R69 DIAGNOSIS DEFERRED: Primary | ICD-10-CM

## 2022-03-22 ENCOUNTER — WALK IN (OUTPATIENT)
Dept: URGENT CARE | Age: 27
End: 2022-03-22

## 2022-03-22 VITALS
DIASTOLIC BLOOD PRESSURE: 73 MMHG | TEMPERATURE: 98.3 F | RESPIRATION RATE: 14 BRPM | HEART RATE: 65 BPM | SYSTOLIC BLOOD PRESSURE: 106 MMHG | OXYGEN SATURATION: 98 %

## 2022-03-22 DIAGNOSIS — H91.91 HEARING LOSS OF RIGHT EAR, UNSPECIFIED HEARING LOSS TYPE: Primary | ICD-10-CM

## 2022-03-22 PROCEDURE — 99214 OFFICE O/P EST MOD 30 MIN: CPT | Performed by: FAMILY MEDICINE

## 2022-03-22 RX ORDER — PREDNISONE 10 MG/1
50 TABLET ORAL DAILY
Qty: 25 TABLET | Refills: 0 | Status: SHIPPED | OUTPATIENT
Start: 2022-03-22 | End: 2022-04-04 | Stop reason: ALTCHOICE

## 2022-03-22 RX ORDER — EMTRICITABINE AND TENOFOVIR ALAFENAMIDE 200; 25 MG/1; MG/1
1 TABLET ORAL DAILY
COMMUNITY
Start: 2022-02-24 | End: 2023-02-19

## 2022-03-23 ENCOUNTER — TELEPHONE (OUTPATIENT)
Dept: OTOLARYNGOLOGY | Age: 27
End: 2022-03-23

## 2022-03-24 ENCOUNTER — OFFICE VISIT (OUTPATIENT)
Dept: OTOLARYNGOLOGY | Age: 27
End: 2022-03-24

## 2022-03-24 ENCOUNTER — OFFICE VISIT (OUTPATIENT)
Dept: AUDIOLOGY | Age: 27
End: 2022-03-24

## 2022-03-24 VITALS
WEIGHT: 160 LBS | OXYGEN SATURATION: 97 % | BODY MASS INDEX: 29.26 KG/M2 | SYSTOLIC BLOOD PRESSURE: 102 MMHG | DIASTOLIC BLOOD PRESSURE: 60 MMHG | HEART RATE: 68 BPM

## 2022-03-24 DIAGNOSIS — J31.0 RHINITIS, CHRONIC: Primary | ICD-10-CM

## 2022-03-24 DIAGNOSIS — H65.91 OME (OTITIS MEDIA WITH EFFUSION), RIGHT: ICD-10-CM

## 2022-03-24 DIAGNOSIS — H93.8X1 PRESSURE SENSATION IN EAR, RIGHT: Primary | ICD-10-CM

## 2022-03-24 DIAGNOSIS — H93.11 TINNITUS OF RIGHT EAR: ICD-10-CM

## 2022-03-24 PROCEDURE — 92567 TYMPANOMETRY: CPT | Performed by: AUDIOLOGIST

## 2022-03-24 PROCEDURE — 99204 OFFICE O/P NEW MOD 45 MIN: CPT | Performed by: STUDENT IN AN ORGANIZED HEALTH CARE EDUCATION/TRAINING PROGRAM

## 2022-03-24 PROCEDURE — 92557 COMPREHENSIVE HEARING TEST: CPT | Performed by: AUDIOLOGIST

## 2022-03-24 RX ORDER — FLUTICASONE PROPIONATE 50 MCG
2 SPRAY, SUSPENSION (ML) NASAL DAILY
Qty: 16 G | Refills: 12 | Status: SHIPPED | OUTPATIENT
Start: 2022-03-24

## 2022-04-04 ENCOUNTER — WALK IN (OUTPATIENT)
Dept: URGENT CARE | Age: 27
End: 2022-04-04

## 2022-04-04 VITALS
RESPIRATION RATE: 14 BRPM | SYSTOLIC BLOOD PRESSURE: 114 MMHG | BODY MASS INDEX: 29.26 KG/M2 | DIASTOLIC BLOOD PRESSURE: 67 MMHG | WEIGHT: 160 LBS | HEART RATE: 99 BPM | OXYGEN SATURATION: 95 % | TEMPERATURE: 98.4 F

## 2022-04-04 DIAGNOSIS — R05.9 COUGH: ICD-10-CM

## 2022-04-04 DIAGNOSIS — B97.89 VIRAL RESPIRATORY ILLNESS: Primary | ICD-10-CM

## 2022-04-04 DIAGNOSIS — R06.2 WHEEZING: ICD-10-CM

## 2022-04-04 DIAGNOSIS — J98.8 VIRAL RESPIRATORY ILLNESS: Primary | ICD-10-CM

## 2022-04-04 LAB — SARS-COV+SARS-COV-2 AG RESP QL IA.RAPID: NOT DETECTED

## 2022-04-04 PROCEDURE — 94640 AIRWAY INHALATION TREATMENT: CPT | Performed by: NURSE PRACTITIONER

## 2022-04-04 PROCEDURE — 87426 SARSCOV CORONAVIRUS AG IA: CPT | Performed by: NURSE PRACTITIONER

## 2022-04-04 PROCEDURE — 99214 OFFICE O/P EST MOD 30 MIN: CPT | Performed by: NURSE PRACTITIONER

## 2022-04-04 RX ORDER — BENZONATATE 200 MG/1
200 CAPSULE ORAL 3 TIMES DAILY PRN
Qty: 20 CAPSULE | Refills: 0 | Status: SHIPPED | OUTPATIENT
Start: 2022-04-04 | End: 2023-01-24 | Stop reason: ALTCHOICE

## 2022-04-04 RX ORDER — IPRATROPIUM BROMIDE AND ALBUTEROL SULFATE 2.5; .5 MG/3ML; MG/3ML
3 SOLUTION RESPIRATORY (INHALATION) ONCE
Status: COMPLETED | OUTPATIENT
Start: 2022-04-04 | End: 2022-04-04

## 2022-04-04 RX ORDER — PREDNISONE 20 MG/1
40 TABLET ORAL DAILY
Qty: 10 TABLET | Refills: 0 | Status: SHIPPED | OUTPATIENT
Start: 2022-04-04 | End: 2022-04-09

## 2022-04-04 RX ADMIN — IPRATROPIUM BROMIDE AND ALBUTEROL SULFATE 3 ML: 2.5; .5 SOLUTION RESPIRATORY (INHALATION) at 17:58

## 2022-04-05 ASSESSMENT — ENCOUNTER SYMPTOMS
RHINORRHEA: 0
SINUS PAIN: 0
APPETITE CHANGE: 0
WHEEZING: 1
CHILLS: 0
SINUS PRESSURE: 0
SORE THROAT: 1
SHORTNESS OF BREATH: 1
DIAPHORESIS: 0
CHOKING: 0
HEADACHES: 0
FATIGUE: 0
VOICE CHANGE: 0
TROUBLE SWALLOWING: 0
FEVER: 0
COUGH: 1
CHEST TIGHTNESS: 0

## 2022-05-03 ENCOUNTER — APPOINTMENT (OUTPATIENT)
Dept: AUDIOLOGY | Age: 27
End: 2022-05-03

## 2022-05-03 ENCOUNTER — APPOINTMENT (OUTPATIENT)
Dept: OTOLARYNGOLOGY | Age: 27
End: 2022-05-03
Attending: FAMILY MEDICINE

## 2022-05-04 ENCOUNTER — APPOINTMENT (OUTPATIENT)
Dept: AUDIOLOGY | Age: 27
End: 2022-05-04

## 2022-05-04 ENCOUNTER — APPOINTMENT (OUTPATIENT)
Dept: OTOLARYNGOLOGY | Age: 27
End: 2022-05-04
Attending: FAMILY MEDICINE

## 2022-06-07 ENCOUNTER — TELEPHONE (OUTPATIENT)
Dept: OTOLARYNGOLOGY | Age: 27
End: 2022-06-07

## 2022-06-28 ENCOUNTER — APPOINTMENT (OUTPATIENT)
Dept: OTOLARYNGOLOGY | Age: 27
End: 2022-06-28

## 2023-01-24 ENCOUNTER — WALK IN (OUTPATIENT)
Dept: URGENT CARE | Age: 28
End: 2023-01-24

## 2023-01-24 VITALS
WEIGHT: 155 LBS | OXYGEN SATURATION: 98 % | BODY MASS INDEX: 28.52 KG/M2 | RESPIRATION RATE: 16 BRPM | HEIGHT: 62 IN | SYSTOLIC BLOOD PRESSURE: 113 MMHG | HEART RATE: 84 BPM | TEMPERATURE: 98.6 F | DIASTOLIC BLOOD PRESSURE: 68 MMHG

## 2023-01-24 DIAGNOSIS — Z20.822 EXPOSURE TO COVID-19 VIRUS: Primary | ICD-10-CM

## 2023-01-24 LAB — SARS-COV+SARS-COV-2 AG RESP QL IA.RAPID: NOT DETECTED

## 2023-01-24 PROCEDURE — U0005 INFEC AGEN DETEC AMPLI PROBE: HCPCS | Performed by: CLINICAL MEDICAL LABORATORY

## 2023-01-24 PROCEDURE — U0003 INFECTIOUS AGENT DETECTION BY NUCLEIC ACID (DNA OR RNA); SEVERE ACUTE RESPIRATORY SYNDROME CORONAVIRUS 2 (SARS-COV-2) (CORONAVIRUS DISEASE [COVID-19]), AMPLIFIED PROBE TECHNIQUE, MAKING USE OF HIGH THROUGHPUT TECHNOLOGIES AS DESCRIBED BY CMS-2020-01-R: HCPCS | Performed by: CLINICAL MEDICAL LABORATORY

## 2023-01-24 PROCEDURE — 87426 SARSCOV CORONAVIRUS AG IA: CPT

## 2023-01-24 PROCEDURE — 99214 OFFICE O/P EST MOD 30 MIN: CPT

## 2023-01-24 RX ORDER — FLUTICASONE PROPIONATE 50 MCG
2 SPRAY, SUSPENSION (ML) NASAL DAILY
Qty: 16 G | Refills: 0 | Status: SHIPPED | OUTPATIENT
Start: 2023-01-24

## 2023-01-24 RX ORDER — ALBUTEROL SULFATE 90 UG/1
2 AEROSOL, METERED RESPIRATORY (INHALATION) EVERY 4 HOURS PRN
Qty: 1 EACH | Refills: 0 | Status: SHIPPED | OUTPATIENT
Start: 2023-01-24

## 2023-01-24 RX ORDER — BENZONATATE 100 MG/1
100 CAPSULE ORAL 3 TIMES DAILY PRN
Qty: 30 CAPSULE | Refills: 0 | Status: SHIPPED | OUTPATIENT
Start: 2023-01-24

## 2023-01-24 ASSESSMENT — ENCOUNTER SYMPTOMS
VOMITING: 0
SORE THROAT: 1
CHOKING: 0
RHINORRHEA: 1
APPETITE CHANGE: 0
VOICE CHANGE: 0
TROUBLE SWALLOWING: 0
SHORTNESS OF BREATH: 0
SINUS PRESSURE: 0
SINUS PAIN: 0
STRIDOR: 0
DIAPHORESIS: 0
CHEST TIGHTNESS: 0
ABDOMINAL PAIN: 0
CHILLS: 1
FEVER: 0
CONSTIPATION: 0
UNEXPECTED WEIGHT CHANGE: 0
WHEEZING: 0
FACIAL SWELLING: 0
COUGH: 1
WOUND: 0
NAUSEA: 0
DIARRHEA: 0
APNEA: 0
FATIGUE: 0
ACTIVITY CHANGE: 0

## 2023-01-25 LAB
SARS-COV-2 RNA RESP QL NAA+PROBE: NOT DETECTED
SERVICE CMNT-IMP: NORMAL
SERVICE CMNT-IMP: NORMAL

## 2023-11-20 ENCOUNTER — WALK IN (OUTPATIENT)
Dept: URGENT CARE | Age: 28
End: 2023-11-20

## 2023-11-20 VITALS
DIASTOLIC BLOOD PRESSURE: 64 MMHG | BODY MASS INDEX: 28.35 KG/M2 | TEMPERATURE: 99.7 F | HEART RATE: 94 BPM | OXYGEN SATURATION: 98 % | SYSTOLIC BLOOD PRESSURE: 116 MMHG | HEIGHT: 62 IN | RESPIRATION RATE: 20 BRPM

## 2023-11-20 DIAGNOSIS — J02.9 SORE THROAT: Primary | ICD-10-CM

## 2023-11-20 LAB
FLUAV RNA RESP QL NAA+PROBE: NOT DETECTED
FLUBV RNA RESP QL NAA+PROBE: NOT DETECTED
INTERNAL PROCEDURAL CONTROLS ACCEPTABLE: YES
RSV AG NPH QL IA.RAPID: NOT DETECTED
S PYO AG THROAT QL IA.RAPID: NEGATIVE
SARS-COV-2 N GENE CT SPEC QN NAA N2: 16.6
SARS-COV-2 RNA RESP QL NAA+PROBE: DETECTED
SERVICE CMNT-IMP: ABNORMAL
TEST LOT EXPIRATION DATE: NORMAL
TEST LOT NUMBER: NORMAL

## 2023-11-20 PROCEDURE — 0241U COVID/FLU/RSV PANEL: CPT | Performed by: CLINICAL MEDICAL LABORATORY

## 2023-11-20 PROCEDURE — 87880 STREP A ASSAY W/OPTIC: CPT | Performed by: PHYSICIAN ASSISTANT

## 2023-11-20 PROCEDURE — 99213 OFFICE O/P EST LOW 20 MIN: CPT | Performed by: PHYSICIAN ASSISTANT

## 2023-11-20 ASSESSMENT — ENCOUNTER SYMPTOMS
FATIGUE: 1
CHILLS: 0
GASTROINTESTINAL NEGATIVE: 1
FACIAL SWELLING: 0
CHOKING: 0
VOICE CHANGE: 0
RHINORRHEA: 1
UNEXPECTED WEIGHT CHANGE: 0
ACTIVITY CHANGE: 0
STRIDOR: 0
COUGH: 1
SHORTNESS OF BREATH: 0
APPETITE CHANGE: 0
TROUBLE SWALLOWING: 0
SORE THROAT: 1
DIAPHORESIS: 0
SINUS PRESSURE: 0
WHEEZING: 0
APNEA: 0
SINUS PAIN: 0
CHEST TIGHTNESS: 0

## 2023-11-21 ENCOUNTER — TELEPHONE (OUTPATIENT)
Dept: URGENT CARE | Age: 28
End: 2023-11-21